# Patient Record
Sex: MALE | Race: BLACK OR AFRICAN AMERICAN | Employment: UNEMPLOYED | URBAN - METROPOLITAN AREA
[De-identification: names, ages, dates, MRNs, and addresses within clinical notes are randomized per-mention and may not be internally consistent; named-entity substitution may affect disease eponyms.]

---

## 2017-03-22 ENCOUNTER — ED HISTORICAL/CONVERTED ENCOUNTER (OUTPATIENT)
Dept: OTHER | Age: 47
End: 2017-03-22

## 2017-03-27 ENCOUNTER — ED HISTORICAL/CONVERTED ENCOUNTER (OUTPATIENT)
Dept: OTHER | Age: 47
End: 2017-03-27

## 2017-12-20 ENCOUNTER — ED HISTORICAL/CONVERTED ENCOUNTER (OUTPATIENT)
Dept: OTHER | Age: 47
End: 2017-12-20

## 2018-01-13 ENCOUNTER — ED HISTORICAL/CONVERTED ENCOUNTER (OUTPATIENT)
Dept: OTHER | Age: 48
End: 2018-01-13

## 2019-08-18 ENCOUNTER — ED HISTORICAL/CONVERTED ENCOUNTER (OUTPATIENT)
Dept: OTHER | Age: 49
End: 2019-08-18

## 2019-10-10 ENCOUNTER — ED HISTORICAL/CONVERTED ENCOUNTER (OUTPATIENT)
Dept: OTHER | Age: 49
End: 2019-10-10

## 2020-08-17 ENCOUNTER — ED HISTORICAL/CONVERTED ENCOUNTER (OUTPATIENT)
Dept: OTHER | Age: 50
End: 2020-08-17

## 2022-07-05 ENCOUNTER — HOSPITAL ENCOUNTER (EMERGENCY)
Age: 52
Discharge: HOME | End: 2022-07-05
Attending: EMERGENCY MEDICINE | Admitting: EMERGENCY MEDICINE
Payer: COMMERCIAL

## 2022-07-05 ENCOUNTER — APPOINTMENT (OUTPATIENT)
Dept: GENERAL RADIOLOGY | Age: 52
End: 2022-07-05
Attending: EMERGENCY MEDICINE
Payer: COMMERCIAL

## 2022-07-05 VITALS
BODY MASS INDEX: 32.88 KG/M2 | WEIGHT: 222 LBS | DIASTOLIC BLOOD PRESSURE: 76 MMHG | OXYGEN SATURATION: 99 % | RESPIRATION RATE: 18 BRPM | SYSTOLIC BLOOD PRESSURE: 137 MMHG | HEIGHT: 69 IN | TEMPERATURE: 98.7 F | HEART RATE: 66 BPM

## 2022-07-05 DIAGNOSIS — L03.119 CELLULITIS OF FOOT: ICD-10-CM

## 2022-07-05 DIAGNOSIS — S91.339A PUNCTURE WOUND OF FOOT, UNSPECIFIED LATERALITY, INITIAL ENCOUNTER: Primary | ICD-10-CM

## 2022-07-05 LAB
ALBUMIN SERPL-MCNC: 3.5 G/DL (ref 3.5–5)
ALBUMIN/GLOB SERPL: 1.1 {RATIO} (ref 1.1–2.2)
ALP SERPL-CCNC: 78 U/L (ref 45–117)
ALT SERPL-CCNC: 21 U/L (ref 12–78)
ANION GAP SERPL CALC-SCNC: 4 MMOL/L (ref 5–15)
AST SERPL W P-5'-P-CCNC: 14 U/L (ref 15–37)
BASOPHILS # BLD: 0 K/UL (ref 0–0.1)
BASOPHILS NFR BLD: 1 % (ref 0–1)
BILIRUB SERPL-MCNC: 0.1 MG/DL (ref 0.2–1)
BUN SERPL-MCNC: 13 MG/DL (ref 6–20)
BUN/CREAT SERPL: 14 (ref 12–20)
CA-I BLD-MCNC: 9.2 MG/DL (ref 8.5–10.1)
CHLORIDE SERPL-SCNC: 114 MMOL/L (ref 97–108)
CO2 SERPL-SCNC: 27 MMOL/L (ref 21–32)
CREAT SERPL-MCNC: 0.9 MG/DL (ref 0.7–1.3)
DIFFERENTIAL METHOD BLD: ABNORMAL
EOSINOPHIL # BLD: 0.1 K/UL (ref 0–0.4)
EOSINOPHIL NFR BLD: 1 % (ref 0–7)
ERYTHROCYTE [DISTWIDTH] IN BLOOD BY AUTOMATED COUNT: 14.2 % (ref 11.5–14.5)
GLOBULIN SER CALC-MCNC: 3.2 G/DL (ref 2–4)
GLUCOSE SERPL-MCNC: 111 MG/DL (ref 65–100)
HCT VFR BLD AUTO: 43.9 % (ref 36.6–50.3)
HGB BLD-MCNC: 14 G/DL (ref 12.1–17)
IMM GRANULOCYTES # BLD AUTO: 0.1 K/UL (ref 0–0.04)
IMM GRANULOCYTES NFR BLD AUTO: 1 % (ref 0–0.5)
LACTATE SERPL-SCNC: 1.4 MMOL/L (ref 0.4–2)
LYMPHOCYTES # BLD: 2.5 K/UL (ref 0.8–3.5)
LYMPHOCYTES NFR BLD: 29 % (ref 12–49)
MCH RBC QN AUTO: 28.6 PG (ref 26–34)
MCHC RBC AUTO-ENTMCNC: 31.9 G/DL (ref 30–36.5)
MCV RBC AUTO: 89.6 FL (ref 80–99)
MONOCYTES # BLD: 0.5 K/UL (ref 0–1)
MONOCYTES NFR BLD: 5 % (ref 5–13)
NEUTS SEG # BLD: 5.6 K/UL (ref 1.8–8)
NEUTS SEG NFR BLD: 63 % (ref 32–75)
NRBC # BLD: 0 K/UL (ref 0–0.01)
NRBC BLD-RTO: 0 PER 100 WBC
PLATELET # BLD AUTO: 252 K/UL (ref 150–400)
PMV BLD AUTO: 11 FL (ref 8.9–12.9)
POTASSIUM SERPL-SCNC: 4.4 MMOL/L (ref 3.5–5.1)
PROT SERPL-MCNC: 6.7 G/DL (ref 6.4–8.2)
RBC # BLD AUTO: 4.9 M/UL (ref 4.1–5.7)
SODIUM SERPL-SCNC: 145 MMOL/L (ref 136–145)
WBC # BLD AUTO: 8.8 K/UL (ref 4.1–11.1)

## 2022-07-05 PROCEDURE — 73630 X-RAY EXAM OF FOOT: CPT

## 2022-07-05 PROCEDURE — 87040 BLOOD CULTURE FOR BACTERIA: CPT

## 2022-07-05 PROCEDURE — 74011250636 HC RX REV CODE- 250/636: Performed by: EMERGENCY MEDICINE

## 2022-07-05 PROCEDURE — 90471 IMMUNIZATION ADMIN: CPT

## 2022-07-05 PROCEDURE — 80053 COMPREHEN METABOLIC PANEL: CPT

## 2022-07-05 PROCEDURE — 99284 EMERGENCY DEPT VISIT MOD MDM: CPT

## 2022-07-05 PROCEDURE — 36415 COLL VENOUS BLD VENIPUNCTURE: CPT

## 2022-07-05 PROCEDURE — 96374 THER/PROPH/DIAG INJ IV PUSH: CPT

## 2022-07-05 PROCEDURE — 83605 ASSAY OF LACTIC ACID: CPT

## 2022-07-05 PROCEDURE — 90715 TDAP VACCINE 7 YRS/> IM: CPT | Performed by: EMERGENCY MEDICINE

## 2022-07-05 PROCEDURE — 85025 COMPLETE CBC W/AUTO DIFF WBC: CPT

## 2022-07-05 PROCEDURE — 96375 TX/PRO/DX INJ NEW DRUG ADDON: CPT

## 2022-07-05 RX ORDER — CIPROFLOXACIN 500 MG/1
500 TABLET ORAL 2 TIMES DAILY
Qty: 20 TABLET | Refills: 0 | Status: SHIPPED | OUTPATIENT
Start: 2022-07-05 | End: 2022-07-15

## 2022-07-05 RX ORDER — MORPHINE SULFATE 4 MG/ML
4 INJECTION INTRAVENOUS ONCE
Status: COMPLETED | OUTPATIENT
Start: 2022-07-05 | End: 2022-07-05

## 2022-07-05 RX ORDER — HYDROCODONE BITARTRATE AND ACETAMINOPHEN 5; 325 MG/1; MG/1
1 TABLET ORAL
Qty: 12 TABLET | Refills: 0 | Status: SHIPPED | OUTPATIENT
Start: 2022-07-05 | End: 2022-07-08

## 2022-07-05 RX ORDER — CIPROFLOXACIN 2 MG/ML
400 INJECTION, SOLUTION INTRAVENOUS EVERY 12 HOURS
Status: DISCONTINUED | OUTPATIENT
Start: 2022-07-05 | End: 2022-07-05 | Stop reason: HOSPADM

## 2022-07-05 RX ORDER — ONDANSETRON 2 MG/ML
4 INJECTION INTRAMUSCULAR; INTRAVENOUS ONCE
Status: COMPLETED | OUTPATIENT
Start: 2022-07-05 | End: 2022-07-05

## 2022-07-05 RX ADMIN — MORPHINE SULFATE 4 MG: 4 INJECTION INTRAVENOUS at 11:57

## 2022-07-05 RX ADMIN — ONDANSETRON 4 MG: 2 INJECTION INTRAMUSCULAR; INTRAVENOUS at 11:57

## 2022-07-05 RX ADMIN — CIPROFLOXACIN 400 MG: 2 INJECTION, SOLUTION INTRAVENOUS at 12:33

## 2022-07-05 RX ADMIN — TETANUS TOXOID, REDUCED DIPHTHERIA TOXOID AND ACELLULAR PERTUSSIS VACCINE, ADSORBED 0.5 ML: 5; 2.5; 8; 8; 2.5 SUSPENSION INTRAMUSCULAR at 11:58

## 2022-07-05 NOTE — DISCHARGE INSTRUCTIONS
Thank you! Thank you for allowing me to care for you in the emergency department. It is my goal to provide you with excellent care. If you have not received excellent quality care, please ask to speak to the nurse manager. Please fill out the survey that will come to you by mail or email since we listen to your feedback! Below you will find a list of your tests from today's visit. Should you have any questions, please do not hesitate to call the emergency department. Labs  Recent Results (from the past 12 hour(s))   LACTIC ACID    Collection Time: 07/05/22 11:53 AM   Result Value Ref Range    Lactic acid 1.4 0.4 - 2.0 mmol/L   CBC WITH AUTOMATED DIFF    Collection Time: 07/05/22 11:53 AM   Result Value Ref Range    WBC 8.8 4.1 - 11.1 K/uL    RBC 4.90 4. 10 - 5.70 M/uL    HGB 14.0 12.1 - 17.0 g/dL    HCT 43.9 36.6 - 50.3 %    MCV 89.6 80.0 - 99.0 FL    MCH 28.6 26.0 - 34.0 PG    MCHC 31.9 30.0 - 36.5 g/dL    RDW 14.2 11.5 - 14.5 %    PLATELET 607 092 - 773 K/uL    MPV 11.0 8.9 - 12.9 FL    NRBC 0.0 0.0  WBC    ABSOLUTE NRBC 0.00 0.00 - 0.01 K/uL    NEUTROPHILS 63 32 - 75 %    LYMPHOCYTES 29 12 - 49 %    MONOCYTES 5 5 - 13 %    EOSINOPHILS 1 0 - 7 %    BASOPHILS 1 0 - 1 %    IMMATURE GRANULOCYTES 1 (H) 0 - 0.5 %    ABS. NEUTROPHILS 5.6 1.8 - 8.0 K/UL    ABS. LYMPHOCYTES 2.5 0.8 - 3.5 K/UL    ABS. MONOCYTES 0.5 0.0 - 1.0 K/UL    ABS. EOSINOPHILS 0.1 0.0 - 0.4 K/UL    ABS. BASOPHILS 0.0 0.0 - 0.1 K/UL    ABS. IMM.  GRANS. 0.1 (H) 0.00 - 0.04 K/UL    DF AUTOMATED     METABOLIC PANEL, COMPREHENSIVE    Collection Time: 07/05/22 11:53 AM   Result Value Ref Range    Sodium 145 136 - 145 mmol/L    Potassium 4.4 3.5 - 5.1 mmol/L    Chloride 114 (H) 97 - 108 mmol/L    CO2 27 21 - 32 mmol/L    Anion gap 4 (L) 5 - 15 mmol/L    Glucose 111 (H) 65 - 100 mg/dL    BUN 13 6 - 20 mg/dL    Creatinine 0.90 0.70 - 1.30 mg/dL    BUN/Creatinine ratio 14 12 - 20      GFR est AA >60 >60 ml/min/1.73m2    GFR est non-AA >60 >60 ml/min/1.73m2    Calcium 9.2 8.5 - 10.1 mg/dL    Bilirubin, total 0.1 (L) 0.2 - 1.0 mg/dL    AST (SGOT) 14 (L) 15 - 37 U/L    ALT (SGPT) 21 12 - 78 U/L    Alk. phosphatase 78 45 - 117 U/L    Protein, total 6.7 6.4 - 8.2 g/dL    Albumin 3.5 3.5 - 5.0 g/dL    Globulin 3.2 2.0 - 4.0 g/dL    A-G Ratio 1.1 1.1 - 2.2         Radiologic Studies  XR FOOT LT MIN 3 V   Final Result   No retained foreign body           CT Results  (Last 48 hours)      None          CXR Results  (Last 48 hours)      None          ------------------------------------------------------------------------------------------------------------  The exam and treatment you received in the Emergency Department were for an urgent problem and are not intended as complete care. It is important that you follow-up with a doctor, nurse practitioner, or physician assistant to:  (1) confirm your diagnosis,  (2) re-evaluation of changes in your illness and treatment, and  (3) for ongoing care. Please take your discharge instructions with you when you go to your follow-up appointment. If you have any problem arranging a follow-up appointment, contact the Emergency Department. If your symptoms become worse or you do not improve as expected and you are unable to reach your health care provider, please return to the Emergency Department. We are available 24 hours a day. If a prescription has been provided, please have it filled as soon as possible to prevent a delay in treatment. If you have any questions or reservations about taking the medication due to side effects or interactions with other medications, please call your primary care provider or contact the ER.

## 2022-07-05 NOTE — ED PROVIDER NOTES
EMERGENCY DEPARTMENT HISTORY AND PHYSICAL EXAM      Date: 7/5/2022  Patient Name: Mirella Tatum    History of Presenting Illness     Chief Complaint   Patient presents with    Foot Injury       History Provided By: Patient    HPI: Mirella Tatum, 46 y.o. male with a past medical history significant No significant past medical history presents to the ED with chief complaint of Foot Injury  . 59-year-old male was wearing shoes when he stepped on a very large possibly 2 inch nail on his left foot went all the way through. He was able to get it out. He has significant pain in that area is hard to walk has not eaten since he has not had the ability to walk to get food. Pain is moderate to severe. No pain meds either. Increasing redness now at the base for the puncture site. There are no other complaints, changes, or physical findings at this time. PCP: Isak Blair MD    Current Facility-Administered Medications   Medication Dose Route Frequency Provider Last Rate Last Admin    ciprofloxacin (CIPRO) 400 mg in D5W IVPB (premix)  400 mg IntraVENous Q12H Ash Barrett  mL/hr at 07/05/22 1233 400 mg at 07/05/22 1233     Current Outpatient Medications   Medication Sig Dispense Refill    ciprofloxacin HCl (Cipro) 500 mg tablet Take 1 Tablet by mouth two (2) times a day for 10 days. 20 Tablet 0    HYDROcodone-acetaminophen (Norco) 5-325 mg per tablet Take 1 Tablet by mouth every six (6) hours as needed for Pain for up to 3 days. Max Daily Amount: 4 Tablets. 12 Tablet 0       Past History     Past Medical History: denies    Past Surgical History: denies    Family History: denies    Social History: denies  Social History     Tobacco Use    Smoking status: Not on file    Smokeless tobacco: Not on file   Substance Use Topics    Alcohol use: Not on file    Drug use: Not on file       Allergies:   Allergies   Allergen Reactions    Docusate Unknown (comments)    Toradol [Ketorolac] Unknown (comments)         Review of Systems   Review of Systems   Constitutional: Negative. Negative for chills, fatigue and fever. HENT: Negative. Negative for congestion, ear pain, nosebleeds and sore throat. Eyes: Negative. Negative for pain, discharge and visual disturbance. Respiratory: Negative. Negative for cough, chest tightness and shortness of breath. Cardiovascular: Negative. Negative for chest pain and leg swelling. Gastrointestinal: Negative. Negative for abdominal pain, blood in stool, constipation, diarrhea, nausea and vomiting. Endocrine: Negative. Genitourinary: Negative. Negative for difficulty urinating, dysuria and flank pain. Musculoskeletal: Positive for joint swelling. Negative for back pain and myalgias. Skin: Positive for wound. Negative for rash. Allergic/Immunologic: Negative. Neurological: Negative. Negative for dizziness, syncope, weakness, numbness and headaches. Hematological: Negative. Does not bruise/bleed easily. Psychiatric/Behavioral: Negative. Negative for agitation, confusion, hallucinations and suicidal ideas. All other systems reviewed and are negative. Physical Exam   Physical Exam  Vitals and nursing note reviewed. Constitutional:       General: He is not in acute distress. Appearance: He is normal weight. He is not ill-appearing. HENT:      Head: Normocephalic and atraumatic. Right Ear: External ear normal.      Left Ear: External ear normal.      Nose: Nose normal. No rhinorrhea. Mouth/Throat:      Mouth: Mucous membranes are moist.      Pharynx: Oropharynx is clear. Eyes:      Extraocular Movements: Extraocular movements intact. Conjunctiva/sclera: Conjunctivae normal.      Pupils: Pupils are equal, round, and reactive to light. Cardiovascular:      Rate and Rhythm: Normal rate and regular rhythm. Pulses: Normal pulses. Heart sounds: Normal heart sounds.    Pulmonary:      Effort: Pulmonary effort is normal. No respiratory distress. Breath sounds: Normal breath sounds. Abdominal:      General: Abdomen is flat. Bowel sounds are normal.      Palpations: Abdomen is soft. Musculoskeletal:         General: No tenderness or deformity. Normal range of motion. Cervical back: Normal range of motion and neck supple. Comments: Left foot with erythema at the arch at the site of the puncture no bleeding noted. Tenderness. No dorsum swelling. Skin:     General: Skin is warm and dry. Capillary Refill: Capillary refill takes less than 2 seconds. Findings: No bruising, lesion or rash. Neurological:      General: No focal deficit present. Mental Status: He is alert and oriented to person, place, and time. Mental status is at baseline. Psychiatric:         Mood and Affect: Mood normal.         Behavior: Behavior normal.         Thought Content: Thought content normal.         Judgment: Judgment normal.         Diagnostic Study Results     Labs -     Recent Results (from the past 12 hour(s))   LACTIC ACID    Collection Time: 07/05/22 11:53 AM   Result Value Ref Range    Lactic acid 1.4 0.4 - 2.0 mmol/L   CBC WITH AUTOMATED DIFF    Collection Time: 07/05/22 11:53 AM   Result Value Ref Range    WBC 8.8 4.1 - 11.1 K/uL    RBC 4.90 4. 10 - 5.70 M/uL    HGB 14.0 12.1 - 17.0 g/dL    HCT 43.9 36.6 - 50.3 %    MCV 89.6 80.0 - 99.0 FL    MCH 28.6 26.0 - 34.0 PG    MCHC 31.9 30.0 - 36.5 g/dL    RDW 14.2 11.5 - 14.5 %    PLATELET 417 791 - 998 K/uL    MPV 11.0 8.9 - 12.9 FL    NRBC 0.0 0.0  WBC    ABSOLUTE NRBC 0.00 0.00 - 0.01 K/uL    NEUTROPHILS 63 32 - 75 %    LYMPHOCYTES 29 12 - 49 %    MONOCYTES 5 5 - 13 %    EOSINOPHILS 1 0 - 7 %    BASOPHILS 1 0 - 1 %    IMMATURE GRANULOCYTES 1 (H) 0 - 0.5 %    ABS. NEUTROPHILS 5.6 1.8 - 8.0 K/UL    ABS. LYMPHOCYTES 2.5 0.8 - 3.5 K/UL    ABS. MONOCYTES 0.5 0.0 - 1.0 K/UL    ABS. EOSINOPHILS 0.1 0.0 - 0.4 K/UL    ABS.  BASOPHILS 0.0 0.0 - 0.1 K/UL ABS. IMM. GRANS. 0.1 (H) 0.00 - 0.04 K/UL    DF AUTOMATED     METABOLIC PANEL, COMPREHENSIVE    Collection Time: 07/05/22 11:53 AM   Result Value Ref Range    Sodium 145 136 - 145 mmol/L    Potassium 4.4 3.5 - 5.1 mmol/L    Chloride 114 (H) 97 - 108 mmol/L    CO2 27 21 - 32 mmol/L    Anion gap 4 (L) 5 - 15 mmol/L    Glucose 111 (H) 65 - 100 mg/dL    BUN 13 6 - 20 mg/dL    Creatinine 0.90 0.70 - 1.30 mg/dL    BUN/Creatinine ratio 14 12 - 20      GFR est AA >60 >60 ml/min/1.73m2    GFR est non-AA >60 >60 ml/min/1.73m2    Calcium 9.2 8.5 - 10.1 mg/dL    Bilirubin, total 0.1 (L) 0.2 - 1.0 mg/dL    AST (SGOT) 14 (L) 15 - 37 U/L    ALT (SGPT) 21 12 - 78 U/L    Alk. phosphatase 78 45 - 117 U/L    Protein, total 6.7 6.4 - 8.2 g/dL    Albumin 3.5 3.5 - 5.0 g/dL    Globulin 3.2 2.0 - 4.0 g/dL    A-G Ratio 1.1 1.1 - 2.2           Radiologic Studies -   XR FOOT LT MIN 3 V   Final Result   No retained foreign body           CT Results  (Last 48 hours)      None          CXR Results  (Last 48 hours)      None            Medical Decision Making and ED Course   I am the first provider for this patient. I reviewed the vital signs, available nursing notes, past medical history, past surgical history, family history and social history. Vital Signs-Reviewed the patient's vital signs. Patient Vitals for the past 12 hrs:   Temp Pulse Resp BP SpO2   07/05/22 1129 -- -- -- 137/76 --   07/05/22 1126 98.7 °F (37.1 °C) 66 18 -- 99 %       EKG interpretation:         Records Reviewed: Previous Hospital chart. EMS run report      ED Course:   Initial assessment performed. The patients presenting problems have been discussed, and they are in agreement with the care plan formulated and outlined with them. I have encouraged them to ask questions as they arise throughout their visit.     Orders Placed This Encounter    CULTURE, BLOOD, PAIRED     Standing Status:   Standing     Number of Occurrences:   1    CULTURE, BLOOD     Standing Status: Standing     Number of Occurrences:   1    XR FOOT LT MIN 3 V     Standing Status:   Standing     Number of Occurrences:   1     Order Specific Question:   Transport     Answer:   BED [2]     Order Specific Question:   Reason for Exam     Answer:   fb recent    LACTIC ACID, PLASMA     If lactic acid level is greater than 2, then a repeat lactic acid level is to be drawn in 6 hours. Standing Status:   Standing     Number of Occurrences:   1    LACTIC ACID, PLASMA     If initial lactic acid level is greater than 2, then a repeat lactic acid level is to be drawn in 6 hours. Standing Status:   Standing     Number of Occurrences:   [id number]    CBC WITH AUTOMATED DIFF     Standing Status:   Standing     Number of Occurrences:   1    COMPREHENSIVE METABOLIC PANEL     Standing Status:   Standing     Number of Occurrences:   1    diph,Pertuss(AC),Tet Vac-PF (BOOSTRIX) suspension 0.5 mL    ciprofloxacin (CIPRO) 400 mg in D5W IVPB (premix)     Order Specific Question:   Antibiotic Indications     Answer:   Skin and Soft Tissue Infection    morphine injection 4 mg    ondansetron (ZOFRAN) injection 4 mg    ciprofloxacin HCl (Cipro) 500 mg tablet     Sig: Take 1 Tablet by mouth two (2) times a day for 10 days. Dispense:  20 Tablet     Refill:  0    HYDROcodone-acetaminophen (Norco) 5-325 mg per tablet     Sig: Take 1 Tablet by mouth every six (6) hours as needed for Pain for up to 3 days. Max Daily Amount: 4 Tablets. Dispense:  12 Tablet     Refill:  0                 Provider Notes (Medical Decision Making):   70-year-old male at risk for Pseudomonas infection as he stepped on a nail with a shoe on. Will cover empirically with a fluoroquinolone. We will do a sepsis work-up given the erythema at the foot. X-ray to evaluate for foreign body. Lab work is unremarkable. Patient is stable for outpatient treatment is already received 1 dose of IV antibiotics.       Consults                   Procedures Disposition       Emergency Department Disposition:  dc      Diagnosis     Clinical Impression:   1. Puncture wound of foot, unspecified laterality, initial encounter    2. Cellulitis of foot        Attestations:    Sneha Jackson MD    Please note that this dictation was completed with [employer], the computer voice recognition software. Quite often unanticipated grammatical, syntax, homophones, and other interpretive errors are inadvertently transcribed by the computer software. Please disregard these errors. Please excuse any errors that have escaped final proofreading. Thank you.

## 2022-07-05 NOTE — ED TRIAGE NOTES
\"stepped on nail a couple days ago (left foot), nail went all the way through\" nail currently not in foot per pt.  Unsure of immunization

## 2022-07-05 NOTE — Clinical Note
[facility] EMERGENCY DEPT  [address] [place] [id number]  [phone]    Work/School Note    Date: 7/5/2022    To Whom It May concern:      Ted Sevilla was seen and treated today in the emergency room by the following provider(s):  Attending Provider: Talia Chao MD.      Ted Sevilla is excused from work/school on 07/05/22. He is clear to return to work/school on 07/06/22.         Sincerely,          Eunice Mcclain MD

## 2022-07-11 LAB
BACTERIA SPEC CULT: NORMAL
SPECIAL REQUESTS,SREQ: NORMAL